# Patient Record
Sex: FEMALE | Race: WHITE | URBAN - METROPOLITAN AREA
[De-identification: names, ages, dates, MRNs, and addresses within clinical notes are randomized per-mention and may not be internally consistent; named-entity substitution may affect disease eponyms.]

---

## 2024-07-23 NOTE — PROGRESS NOTES
Patient _x__ reached   _____not reached-preop instructions left on voice mail_____________    0600  DATE_7/31 0730_______ TIME________ARRIVAL_________      Nothing to eat or drink after midnight the night before,except for what the prep instructions call for.If you do not have the instructions or do not understand them please contact your doctors office.    Follow any instructions your doctors office has given you including what medications to take the AM of your procedure and which ones to hold.You may use your inhalers - bring rescue inhalers with you DOS.If you take a long acting insulin the sarah prior please cut the dose in half and take no diabetic medications that AM.Follow specific doctors office instructions regarding blood thinners and if they want you to hold and for how long. If you are on a blood thinner and have no instructions please contact the office and ask.    Dress comfortably,bring your insurance card,picture ID,and a complete list of medications, including supplements.    You must have a responsible adult to stay with you during the procedure,drive you home and stay with you.    [facility] phone number [phone] for any questions.      OTHER INSTRUCTIONS(if applicable)_________________________________________________________        VISITOR POLICY(subject to change)    Current visitor policy is 2 visitors per patient.No children allowed. Mask at discretion of facility.  Visiting hours are 8a-8p. Overnight visitors will be at the discretion of the nurse. All policies are subject to change.

## 2024-07-31 ENCOUNTER — ANESTHESIA (OUTPATIENT)
Dept: ENDOSCOPY | Age: 49
End: 2024-07-31
Payer: COMMERCIAL

## 2024-07-31 ENCOUNTER — HOSPITAL ENCOUNTER (OUTPATIENT)
Age: 49
Setting detail: OUTPATIENT SURGERY
Discharge: HOME | End: 2024-07-31
Attending: INTERNAL MEDICINE | Admitting: INTERNAL MEDICINE
Payer: COMMERCIAL

## 2024-07-31 ENCOUNTER — ANESTHESIA EVENT (OUTPATIENT)
Dept: ENDOSCOPY | Age: 49
End: 2024-07-31
Payer: COMMERCIAL

## 2024-07-31 VITALS
DIASTOLIC BLOOD PRESSURE: 68 MMHG | HEART RATE: 66 BPM | OXYGEN SATURATION: 99 % | RESPIRATION RATE: 12 BRPM | HEIGHT: 67 IN | TEMPERATURE: 97 F | WEIGHT: 140 LBS | SYSTOLIC BLOOD PRESSURE: 106 MMHG | BODY MASS INDEX: 21.97 KG/M2

## 2024-07-31 DIAGNOSIS — Z80.0 FAMILY HISTORY OF COLON CANCER: ICD-10-CM

## 2024-07-31 DIAGNOSIS — Z12.11 COLON CANCER SCREENING: ICD-10-CM

## 2024-07-31 LAB — HCG UR QL: NEGATIVE

## 2024-07-31 PROCEDURE — 7100000010 HC PHASE II RECOVERY - FIRST 15 MIN: Performed by: INTERNAL MEDICINE

## 2024-07-31 PROCEDURE — 6360000002 HC RX W HCPCS: Performed by: NURSE ANESTHETIST, CERTIFIED REGISTERED

## 2024-07-31 PROCEDURE — 2580000003 HC RX 258: Performed by: ANESTHESIOLOGY

## 2024-07-31 PROCEDURE — 2500000003 HC RX 250 WO HCPCS: Performed by: NURSE ANESTHETIST, CERTIFIED REGISTERED

## 2024-07-31 PROCEDURE — 7100000011 HC PHASE II RECOVERY - ADDTL 15 MIN: Performed by: INTERNAL MEDICINE

## 2024-07-31 PROCEDURE — 3700000001 HC ADD 15 MINUTES (ANESTHESIA): Performed by: INTERNAL MEDICINE

## 2024-07-31 PROCEDURE — 3609010600 HC COLONOSCOPY POLYPECTOMY SNARE/COLD BIOPSY: Performed by: INTERNAL MEDICINE

## 2024-07-31 PROCEDURE — 2709999900 HC NON-CHARGEABLE SUPPLY: Performed by: INTERNAL MEDICINE

## 2024-07-31 PROCEDURE — 3700000000 HC ANESTHESIA ATTENDED CARE: Performed by: INTERNAL MEDICINE

## 2024-07-31 PROCEDURE — 84703 CHORIONIC GONADOTROPIN ASSAY: CPT

## 2024-07-31 RX ORDER — SODIUM CHLORIDE 9 MG/ML
INJECTION, SOLUTION INTRAVENOUS CONTINUOUS
Status: DISCONTINUED | OUTPATIENT
Start: 2024-07-31 | End: 2024-07-31 | Stop reason: HOSPADM

## 2024-07-31 RX ORDER — PROPOFOL 10 MG/ML
INJECTION, EMULSION INTRAVENOUS CONTINUOUS PRN
Status: DISCONTINUED | OUTPATIENT
Start: 2024-07-31 | End: 2024-07-31 | Stop reason: SDUPTHER

## 2024-07-31 RX ORDER — LIDOCAINE HYDROCHLORIDE 20 MG/ML
INJECTION, SOLUTION INFILTRATION; PERINEURAL PRN
Status: DISCONTINUED | OUTPATIENT
Start: 2024-07-31 | End: 2024-07-31 | Stop reason: SDUPTHER

## 2024-07-31 RX ADMIN — PROPOFOL 40 MG: 10 INJECTION, EMULSION INTRAVENOUS at 07:31

## 2024-07-31 RX ADMIN — LIDOCAINE HYDROCHLORIDE 80 MG: 20 INJECTION, SOLUTION INFILTRATION; PERINEURAL at 07:28

## 2024-07-31 RX ADMIN — PROPOFOL 120 MCG/KG/MIN: 10 INJECTION, EMULSION INTRAVENOUS at 07:27

## 2024-07-31 RX ADMIN — SODIUM CHLORIDE: 9 INJECTION, SOLUTION INTRAVENOUS at 06:34

## 2024-07-31 RX ADMIN — SODIUM CHLORIDE: 9 INJECTION, SOLUTION INTRAVENOUS at 06:54

## 2024-07-31 RX ADMIN — PROPOFOL 60 MG: 10 INJECTION, EMULSION INTRAVENOUS at 07:28

## 2024-07-31 ASSESSMENT — LIFESTYLE VARIABLES: SMOKING_STATUS: 0

## 2024-07-31 ASSESSMENT — PAIN - FUNCTIONAL ASSESSMENT: PAIN_FUNCTIONAL_ASSESSMENT: NONE - DENIES PAIN

## 2024-07-31 NOTE — H&P
Gastroenterology Note                 Pre-operative History and Physical    Patient: Ernestina Moncada  : 1975  CSN:     History Obtained From:   Patient or guardian.      HISTORY OF PRESENT ILLNESS:    The patient is a 48 y.o. female here for Endoscopy.      Past Medical History:    Past Medical History:   Diagnosis Date    Cancer (HCC)     basal cell face    Ectopic pregnancy     Lyme disease      Past Surgical History:    Past Surgical History:   Procedure Laterality Date    BASAL CELL CARCINOMA EXCISION      face    BONE MARROW BIOPSY  2011    KNEE SURGERY Right 1993     Medications Prior to Admission:   No current facility-administered medications on file prior to encounter.     Current Outpatient Medications on File Prior to Encounter   Medication Sig Dispense Refill    PROGESTERONE PO Take by mouth Daily      EST ESTROGENS-METHYLTEST HS PO Take by mouth Daily          Allergies:  Vancomycin, Amoxicillin, Cephalosporins, Clindamycin, and Ampicillin      Social History:   Social History     Tobacco Use    Smoking status: Never    Smokeless tobacco: Not on file   Substance Use Topics    Alcohol use: Not Currently     Family History:   Family History   Problem Relation Age of Onset    Diabetes Father     Colon Cancer Paternal Cousin        PHYSICAL EXAM:      /65   Pulse 69   Temp 96.8 °F (36 °C) (Temporal)   Resp 14   Ht 1.702 m (5' 7\")   Wt 63.5 kg (140 lb)   SpO2 100%   BMI 21.93 kg/m²  I        Heart:  RRR, normal s1s2    Lungs:  CTA and normal effort    Abdomen:   Soft, nt nd.         ASSESSMENT AND PLAN:    1.  Patient is a 48 y.o. female here for endoscopy with MAC sedation.    2.  Procedure options, risks and benefits reviewed with patient and/or guardian.  They express understanding.

## 2024-07-31 NOTE — ANESTHESIA PRE PROCEDURE
Department of Anesthesiology  Preprocedure Note       Name:  Ernestina Moncada   Age:  48 y.o.  :  1975                                          MRN:  [id number]         Date:  2024      Surgeon: Surgeon(s):  Manny Salinas MD    Procedure: Procedure(s):  COLONOSCOPY DIAGNOSTIC    Medications prior to admission:   Prior to Admission medications    Medication Sig Start Date End Date Taking? Authorizing Provider   PROGESTERONE PO Take by mouth Daily   Yes ProviderPauline MD   EST ESTROGENS-METHYLTEST HS PO Take by mouth Daily   Yes ProviderPauline MD       Current medications:    Current Facility-Administered Medications   Medication Dose Route Frequency Provider Last Rate Last Admin    0.9 % sodium chloride infusion   IntraVENous Continuous Link Kennedy MD   New Bag at 24 0634       Allergies:    Allergies   Allergen Reactions    Vancomycin      swell    Amoxicillin Hives    Cephalosporins Hives     \"almost\" anaphylactic reaction - per pt    Clindamycin Hives    Ampicillin Itching and Rash       Problem List:    Patient Active Problem List   Diagnosis Code    Chronic leukopenia D72.819       Past Medical History:        Diagnosis Date    Cancer (HCC)     basal cell face    Ectopic pregnancy     Lyme disease        Past Surgical History:        Procedure Laterality Date    BASAL CELL CARCINOMA EXCISION      face    BONE MARROW BIOPSY  2011    KNEE SURGERY  1993       Social History:    Social History     Tobacco Use    Smoking status: Never    Smokeless tobacco: Not on file   Substance Use Topics    Alcohol use: Not on file                                Counseling given: Not Answered      Vital Signs (Current):   Vitals:    24 1250 24 0641   BP:  106/65   Pulse:  69   Resp:  14   Temp:  96.8 °F (36 °C)   TempSrc:  Temporal   SpO2:  100%   Weight: 65.8 kg (145 lb) 63.5 kg (140 lb)   Height: 1.715 m (5' 7.5\") 1.702 m (5' 7\")

## 2024-07-31 NOTE — DISCHARGE INSTRUCTIONS
Call Dr. Salinas for any problems and to schedule followup [phone].    ENDOSCOPY DISCHARGE INSTRUCTIONS   7/31/2024    You may experience some lightheadedness for the next several hours.  Plan on quiet relaxation for the rest of today.  A responsible adult needs to stay with you today.  Because of the medications you received today-do not drive,operate machinery,or sign any contractual agreement for the next 24 hours.  Do not drink any alcoholic beverages or take any unprescribed medications tonight.  Eat bland food and avoid anything greasy or spicy initially-progress to your normal diet gradually.  Diet restrictions as instructed.  If you have any of the following problems, notify your physician or return to the hospital emergency room : fever, chills, excessive bleeding, excessive vomiting, difficulty swallowing, uncontrolled pain, increased abdominal distention, shortness of breath or any other problems.  If you had a polyp removed, avoid strenuous activity for 48 hours. Avoid the use of aspirin or related compounds for one week, unless otherwise instructed by your physician.  If you have a sore throat, you may use lozenges or salt water gargles.  Call your doctor for pathology results and/or follow up appointment.  Should be ready in 3-5 days.  Call the GI doctor's office for results.      SEDATION DISCHARGE INSTRUCTIONS    7/31/2024     Wear your seatbelt home.  You are under the influence of drugs. Do not drink alcohol, drive, operate machinery, or make any important decisions or sign any legal documents for 24 hours  A responsible adult needs to be with you for 24 hours.  You may experience lightheadedness, dizziness, nausea, heightened emotions and/or sleepiness following surgery.  Rest at home today- increase activity as tolerated.  Progress slowly to a regular diet and drink plenty of fluids unless your physician has instructed you otherwise.  If nausea becomes a problem, call your

## 2024-07-31 NOTE — PROGRESS NOTES
Discharge instructions reviewed with patient/responsible adult. All home medications have been reviewed, questions answered and patient verbalized understanding.  Discharge instructions signed and copies given. Patient discharged home with belongings.

## 2024-07-31 NOTE — ANESTHESIA POSTPROCEDURE EVALUATION
Department of Anesthesiology  Postprocedure Note    Patient: Ernestina Moncada  MRN: [id number]  YOB: 1975  Date of evaluation: 7/31/2024    Procedure Summary       Date: 07/31/24 Room / Location: [place] [id number] / [facility]    Anesthesia Start: 0725 Anesthesia Stop: 0759    Procedure: COLONOSCOPY POLYPECTOMY SNARE/BIOPSY Diagnosis:       Family history of colon cancer      Colon cancer screening      (Family history of colon cancer [Z80.0])      (Colon cancer screening [Z12.11])    Surgeons: Manny Salinas MD Responsible Provider: Link Kennedy MD    Anesthesia Type: MAC ASA Status: 1            Anesthesia Type: No value filed.    Willi Phase I: Willi Score: 10    Willi Phase II: Willi Score: 9    Anesthesia Post Evaluation    Patient location during evaluation: PACU  Patient participation: complete - patient participated  Level of consciousness: awake and alert  Airway patency: patent  Nausea & Vomiting: no vomiting and no nausea  Cardiovascular status: hemodynamically stable  Respiratory status: acceptable  Hydration status: stable  Pain management: adequate    No notable events documented.

## (undated) DEVICE — AIR/WATER CLEANING ADAPTER FOR OLYMPUS® GI ENDOSCOPE: Brand: BULLDOG®

## (undated) DEVICE — SOLUTION IRRIG 500ML STRL H2O NONPYROGENIC

## (undated) DEVICE — SINGLE USE AIR/WATER, SUCTION AND BIOPSY VALVES SET: Brand: ORCAPOD™

## (undated) DEVICE — ENDOSCOPIC KIT 6X3/16 FT COLON W/ 1.1 OZ 2 GWN W/O BRSH

## (undated) DEVICE — TRAP SPEC RETRV CLR PLAS POLYP IN LN SUCT QUIK CTCH

## (undated) DEVICE — SNARES COLD OVAL 10MM THIN

## (undated) DEVICE — BW-412T DISP COMBO CLEANING BRUSH: Brand: SINGLE USE COMBINATION CLEANING BRUSH